# Patient Record
Sex: MALE | Race: OTHER | NOT HISPANIC OR LATINO | Employment: FULL TIME | URBAN - METROPOLITAN AREA
[De-identification: names, ages, dates, MRNs, and addresses within clinical notes are randomized per-mention and may not be internally consistent; named-entity substitution may affect disease eponyms.]

---

## 2022-01-05 ENCOUNTER — OFFICE VISIT (OUTPATIENT)
Dept: OTOLARYNGOLOGY | Facility: CLINIC | Age: 32
End: 2022-01-05
Payer: COMMERCIAL

## 2022-01-05 VITALS — BODY MASS INDEX: 28.21 KG/M2 | HEIGHT: 74 IN | WEIGHT: 219.81 LBS

## 2022-01-05 DIAGNOSIS — J34.2 NASAL SEPTAL DEVIATION: ICD-10-CM

## 2022-01-05 DIAGNOSIS — J30.1 NON-SEASONAL ALLERGIC RHINITIS DUE TO POLLEN: ICD-10-CM

## 2022-01-05 DIAGNOSIS — J34.3 HYPERTROPHY OF INFERIOR NASAL TURBINATE: Primary | ICD-10-CM

## 2022-01-05 PROCEDURE — 3008F PR BODY MASS INDEX (BMI) DOCUMENTED: ICD-10-PCS | Mod: CPTII,S$GLB,, | Performed by: OTOLARYNGOLOGY

## 2022-01-05 PROCEDURE — 99999 PR PBB SHADOW E&M-NEW PATIENT-LVL II: ICD-10-PCS | Mod: PBBFAC,,, | Performed by: OTOLARYNGOLOGY

## 2022-01-05 PROCEDURE — 1160F RVW MEDS BY RX/DR IN RCRD: CPT | Mod: CPTII,S$GLB,, | Performed by: OTOLARYNGOLOGY

## 2022-01-05 PROCEDURE — 99204 OFFICE O/P NEW MOD 45 MIN: CPT | Mod: 25,S$GLB,, | Performed by: OTOLARYNGOLOGY

## 2022-01-05 PROCEDURE — 31231 PR NASAL ENDOSCOPY, DX: ICD-10-PCS | Mod: S$GLB,,, | Performed by: OTOLARYNGOLOGY

## 2022-01-05 PROCEDURE — 3008F BODY MASS INDEX DOCD: CPT | Mod: CPTII,S$GLB,, | Performed by: OTOLARYNGOLOGY

## 2022-01-05 PROCEDURE — 1159F MED LIST DOCD IN RCRD: CPT | Mod: CPTII,S$GLB,, | Performed by: OTOLARYNGOLOGY

## 2022-01-05 PROCEDURE — 99999 PR PBB SHADOW E&M-NEW PATIENT-LVL II: CPT | Mod: PBBFAC,,, | Performed by: OTOLARYNGOLOGY

## 2022-01-05 PROCEDURE — 31231 NASAL ENDOSCOPY DX: CPT | Mod: S$GLB,,, | Performed by: OTOLARYNGOLOGY

## 2022-01-05 PROCEDURE — 99204 PR OFFICE/OUTPT VISIT, NEW, LEVL IV, 45-59 MIN: ICD-10-PCS | Mod: 25,S$GLB,, | Performed by: OTOLARYNGOLOGY

## 2022-01-05 PROCEDURE — 1159F PR MEDICATION LIST DOCUMENTED IN MEDICAL RECORD: ICD-10-PCS | Mod: CPTII,S$GLB,, | Performed by: OTOLARYNGOLOGY

## 2022-01-05 PROCEDURE — 1160F PR REVIEW ALL MEDS BY PRESCRIBER/CLIN PHARMACIST DOCUMENTED: ICD-10-PCS | Mod: CPTII,S$GLB,, | Performed by: OTOLARYNGOLOGY

## 2022-01-05 RX ORDER — DEXAMETHASONE 4 MG/1
1 TABLET ORAL 2 TIMES DAILY
COMMUNITY
Start: 2021-11-13

## 2022-01-05 RX ORDER — FLUTICASONE PROPIONATE 50 MCG
2 SPRAY, SUSPENSION (ML) NASAL DAILY
Qty: 15.8 ML | Refills: 5 | Status: SHIPPED | OUTPATIENT
Start: 2022-01-05 | End: 2022-08-03

## 2022-01-05 RX ORDER — SERTRALINE HYDROCHLORIDE 100 MG/1
100 TABLET, FILM COATED ORAL DAILY
COMMUNITY
Start: 2021-11-07

## 2022-01-05 RX ORDER — ALPRAZOLAM 0.5 MG/1
0.5 TABLET ORAL DAILY PRN
COMMUNITY
Start: 2021-10-14

## 2022-01-05 NOTE — PROGRESS NOTES
"Referring Provider:    Kae Self  No address on file  Subjective:   Patient: Reese Caba [id number], :1990   Visit date:2022 2:37 PM    Chief Complaint:  Other (Pt states that ever since he had covid, he has issues breathing. He states that it is worse at night than it is during the day. He states that this makes it almost impossible to sleep. Pt went to his PCP and was prescribed an inhaler, and a nebulizer. He states the symptoms have progressed on for months and that he cannot even run without feeling like there is no air in his lungs. For relief he is using over the counter prescriptions )    HPI:    Prior notes reviewed by myself.  Clinical documentation obtained by nursing staff reviewed.     31-year-old gentleman presents with complaints alternating nasal congestion as well as intermittent shortness of breath. Nasal congestion worse when supine. He had COVID several months ago and since that time he has had these symptoms.  He has had multiple chest x-rays with urgent care and his PCP and he is currently using saline spray.  He has tried inhaled bronchodilators and steroids with mixed success in relieving his symptoms.  He is not taking any topical nasal steroids or antihistamines currently.      Objective:     Physical Exam:  Vitals:  Ht 6' 2" (1.88 m)   Wt 99.7 kg (219 lb 12.8 oz)   BMI 28.22 kg/m²   General appearance:  Well developed, well nourished    Ears:  Otoscopy of external auditory canals and tympanic membranes was normal, clinical speech reception thresholds grossly intact, no mass/lesion of auricle.    Nose:  No masses/lesions of external nose, nasal mucosa,  within normal limits.  Septum deviated to the left, 3+ turbinate hypertrophy    Mouth:  No mass/lesion of lips, teeth, gums, hard/soft palate, tongue, tonsils, or oropharynx.    Neck & Lymphatics:  No cervical lymphadenopathy, no neck mass/crepitus/ asymmetry, trachea is midline, no thyroid " enlargement/tenderness/mass.    Pulmonary:  Clear to auscultation bilaterally      PROCEDURE NOTE:  Diagnostic nasal endoscopy  Preprocedure diagnosis:  Nasal obstruction  Postprocedure diangosis:  Same  Complications:  None  Blood Loss:  None    Procedure in detail:  After verbal consent was obtained, the patient's nasal cavity was anesthesized using topical Tetracaine and Neosynepherine.  A rigid 30 degree endoscope was placed in first the right, then the left nasal cavity.  The inferior and middle turbinates were examined, and found to be normal bilaterally.  The middle meatus and maxillary antrum was also examined, and found to be normal bilaterally.  No purulent drainage or masses seen.  The patient tolerated the procedure well and there were no complications.              Assessment & Plan:   Hypertrophy of inferior nasal turbinate    Nasal septal deviation    Non-seasonal allergic rhinitis due to pollen        He has signs and symptoms consistent with allergic rhinitis and turbinate hypertrophy.  I suggested that we get him started on an over-the-counter antihistamine and topical nasal steroid spray.  I encouraged him to use these consistently for at least 6 weeks.  We did briefly discuss the option of septoplasty and bilateral submucous resection of the inferior turbinates if the medical therapy is not successful.  We briefly discussed his occasional pulmonary complaints.  We agreed to proceed with the treatment for his nasal obstruction and revisit this next time.  I offered to refer him to Pulmonary and he will consider

## 2022-02-16 ENCOUNTER — OFFICE VISIT (OUTPATIENT)
Dept: OTOLARYNGOLOGY | Facility: CLINIC | Age: 32
End: 2022-02-16
Payer: COMMERCIAL

## 2022-02-16 VITALS — BODY MASS INDEX: 28.71 KG/M2 | WEIGHT: 223.75 LBS | HEIGHT: 74 IN

## 2022-02-16 DIAGNOSIS — J34.2 NASAL SEPTAL DEVIATION: ICD-10-CM

## 2022-02-16 DIAGNOSIS — J34.3 HYPERTROPHY OF INFERIOR NASAL TURBINATE: Primary | ICD-10-CM

## 2022-02-16 DIAGNOSIS — J30.1 NON-SEASONAL ALLERGIC RHINITIS DUE TO POLLEN: ICD-10-CM

## 2022-02-16 PROCEDURE — 99213 OFFICE O/P EST LOW 20 MIN: CPT | Mod: S$GLB,,, | Performed by: OTOLARYNGOLOGY

## 2022-02-16 PROCEDURE — 1159F MED LIST DOCD IN RCRD: CPT | Mod: CPTII,S$GLB,, | Performed by: OTOLARYNGOLOGY

## 2022-02-16 PROCEDURE — 3008F BODY MASS INDEX DOCD: CPT | Mod: CPTII,S$GLB,, | Performed by: OTOLARYNGOLOGY

## 2022-02-16 PROCEDURE — 1159F PR MEDICATION LIST DOCUMENTED IN MEDICAL RECORD: ICD-10-PCS | Mod: CPTII,S$GLB,, | Performed by: OTOLARYNGOLOGY

## 2022-02-16 PROCEDURE — 99999 PR PBB SHADOW E&M-EST. PATIENT-LVL III: ICD-10-PCS | Mod: PBBFAC,,, | Performed by: OTOLARYNGOLOGY

## 2022-02-16 PROCEDURE — 3008F PR BODY MASS INDEX (BMI) DOCUMENTED: ICD-10-PCS | Mod: CPTII,S$GLB,, | Performed by: OTOLARYNGOLOGY

## 2022-02-16 PROCEDURE — 99999 PR PBB SHADOW E&M-EST. PATIENT-LVL III: CPT | Mod: PBBFAC,,, | Performed by: OTOLARYNGOLOGY

## 2022-02-16 PROCEDURE — 1160F PR REVIEW ALL MEDS BY PRESCRIBER/CLIN PHARMACIST DOCUMENTED: ICD-10-PCS | Mod: CPTII,S$GLB,, | Performed by: OTOLARYNGOLOGY

## 2022-02-16 PROCEDURE — 99213 PR OFFICE/OUTPT VISIT, EST, LEVL III, 20-29 MIN: ICD-10-PCS | Mod: S$GLB,,, | Performed by: OTOLARYNGOLOGY

## 2022-02-16 PROCEDURE — 1160F RVW MEDS BY RX/DR IN RCRD: CPT | Mod: CPTII,S$GLB,, | Performed by: OTOLARYNGOLOGY

## 2022-02-16 NOTE — PROGRESS NOTES
"Referring Provider:    No referring provider defined for this encounter.  Subjective:   Patient: Reese Caba [id number], :1990   Visit date:2022 2:37 PM    Chief Complaint:  Follow-up    HPI:    Prior notes reviewed by myself.  Clinical documentation obtained by nursing staff reviewed.     31-year-old gentleman presents with complaints alternating nasal congestion as well as intermittent shortness of breath. Nasal congestion worse when supine. He had COVID several months ago and since that time he has had these symptoms.  He has had multiple chest x-rays with urgent care and his PCP and he is currently using saline spray.  He has tried inhaled bronchodilators and steroids with mixed success in relieving his symptoms.  He is not taking any topical nasal steroids or antihistamines currently.    22 update:  Feels shortness of breath and nasal congestion have improved    Objective:     Physical Exam:  Vitals:  Ht 6' 2" (1.88 m)   Wt 101.5 kg (223 lb 12.3 oz)   BMI 28.73 kg/m²   General appearance:  Well developed, well nourished    Ears:  Otoscopy of external auditory canals and tympanic membranes was normal, clinical speech reception thresholds grossly intact, no mass/lesion of auricle.    Nose:  No masses/lesions of external nose, nasal mucosa,  within normal limits.  Septum deviated to the left, 3+ turbinate hypertrophy    Mouth:  No mass/lesion of lips, teeth, gums, hard/soft palate, tongue, tonsils, or oropharynx.    Neck & Lymphatics:  No cervical lymphadenopathy, no neck mass/crepitus/ asymmetry, trachea is midline, no thyroid enlargement/tenderness/mass.    Pulmonary:  Clear to auscultation bilaterally              Assessment & Plan:   Hypertrophy of inferior nasal turbinate    Nasal septal deviation    Non-seasonal allergic rhinitis due to pollen        He has signs and symptoms consistent with allergic rhinitis and turbinate hypertrophy.  I suggested that we get him started on an " over-the-counter antihistamine and topical nasal steroid spray.  I encouraged him to use these consistently for at least 6 weeks.  We did briefly discuss the option of septoplasty and bilateral submucous resection of the inferior turbinates if the medical therapy is not successful.  We briefly discussed his occasional pulmonary complaints.  We agreed to proceed with the treatment for his nasal obstruction and revisit this next time.  I offered to refer him to Pulmonary and he will consider    2/16/22 update:  Feels that his symptoms have vastly improved just with the addition of consistent topical nasal steroids and antihistamines.  He will follow up p.r.n.